# Patient Record
Sex: FEMALE | Race: WHITE | NOT HISPANIC OR LATINO | Employment: PART TIME | URBAN - METROPOLITAN AREA
[De-identification: names, ages, dates, MRNs, and addresses within clinical notes are randomized per-mention and may not be internally consistent; named-entity substitution may affect disease eponyms.]

---

## 2017-01-26 ENCOUNTER — TELEPHONE (OUTPATIENT)
Dept: SLEEP MEDICINE | Facility: CLINIC | Age: 37
End: 2017-01-26

## 2017-01-26 NOTE — TELEPHONE ENCOUNTER
MAGDALENE for patient to call back and schedule appointment with any provider in office regarding questions she wrote in a few months ago to dr. Simon

## 2017-02-08 ENCOUNTER — COMMUNICATION - HEALTHEAST (OUTPATIENT)
Dept: FAMILY MEDICINE | Facility: CLINIC | Age: 37
End: 2017-02-08

## 2017-02-10 ENCOUNTER — COMMUNICATION - HEALTHEAST (OUTPATIENT)
Dept: TELEHEALTH | Facility: CLINIC | Age: 37
End: 2017-02-10

## 2017-02-10 ENCOUNTER — OFFICE VISIT - HEALTHEAST (OUTPATIENT)
Dept: FAMILY MEDICINE | Facility: CLINIC | Age: 37
End: 2017-02-10

## 2017-02-10 DIAGNOSIS — R53.83 FATIGUE: ICD-10-CM

## 2017-02-10 DIAGNOSIS — R30.0 DYSURIA: ICD-10-CM

## 2017-02-10 DIAGNOSIS — L71.9 ROSACEA: ICD-10-CM

## 2017-02-10 DIAGNOSIS — L70.9 ACNE, UNSPECIFIED ACNE TYPE: ICD-10-CM

## 2017-02-10 ASSESSMENT — MIFFLIN-ST. JEOR: SCORE: 1309.09

## 2017-02-13 LAB — HBA1C MFR BLD: 5.2 % (ref 3.5–6.1)

## 2017-02-14 LAB — ANA SER QL: 0.1 U

## 2017-02-20 ENCOUNTER — OFFICE VISIT - HEALTHEAST (OUTPATIENT)
Dept: FAMILY MEDICINE | Facility: CLINIC | Age: 37
End: 2017-02-20

## 2017-02-20 DIAGNOSIS — R30.0 DYSURIA: ICD-10-CM

## 2017-02-20 DIAGNOSIS — N89.8 VAGINAL ITCHING: ICD-10-CM

## 2017-12-31 ENCOUNTER — HEALTH MAINTENANCE LETTER (OUTPATIENT)
Age: 37
End: 2017-12-31

## 2019-08-14 ENCOUNTER — OFFICE VISIT - HEALTHEAST (OUTPATIENT)
Dept: FAMILY MEDICINE | Facility: CLINIC | Age: 39
End: 2019-08-14

## 2019-08-14 DIAGNOSIS — M79.7 FIBROMYALGIA: ICD-10-CM

## 2019-08-14 DIAGNOSIS — M54.50 ACUTE BILATERAL LOW BACK PAIN WITHOUT SCIATICA: ICD-10-CM

## 2019-08-14 DIAGNOSIS — M54.9 MID BACK PAIN: ICD-10-CM

## 2019-10-09 ENCOUNTER — OFFICE VISIT - HEALTHEAST (OUTPATIENT)
Dept: FAMILY MEDICINE | Facility: CLINIC | Age: 39
End: 2019-10-09

## 2019-10-09 DIAGNOSIS — G93.32 CHRONIC FATIGUE SYNDROME: ICD-10-CM

## 2019-10-09 DIAGNOSIS — R79.0 LOW FERRITIN: ICD-10-CM

## 2019-10-09 DIAGNOSIS — R79.89 LOW VITAMIN D LEVEL: ICD-10-CM

## 2019-10-09 DIAGNOSIS — Z76.89 ENCOUNTER TO ESTABLISH CARE: ICD-10-CM

## 2019-10-09 DIAGNOSIS — M79.7 FIBROMYALGIA: ICD-10-CM

## 2019-10-09 DIAGNOSIS — Z00.00 ROUTINE GENERAL MEDICAL EXAMINATION AT A HEALTH CARE FACILITY: ICD-10-CM

## 2019-10-09 DIAGNOSIS — R53.83 OTHER FATIGUE: ICD-10-CM

## 2019-10-09 LAB
ANION GAP SERPL CALCULATED.3IONS-SCNC: 8 MMOL/L (ref 5–18)
BUN SERPL-MCNC: 11 MG/DL (ref 8–22)
CALCIUM SERPL-MCNC: 10 MG/DL (ref 8.5–10.5)
CHLORIDE BLD-SCNC: 108 MMOL/L (ref 98–107)
CHOLEST SERPL-MCNC: 154 MG/DL
CO2 SERPL-SCNC: 25 MMOL/L (ref 22–31)
CREAT SERPL-MCNC: 0.88 MG/DL (ref 0.6–1.1)
ERYTHROCYTE [DISTWIDTH] IN BLOOD BY AUTOMATED COUNT: 12.9 % (ref 11–14.5)
FASTING STATUS PATIENT QL REPORTED: YES
FERRITIN SERPL-MCNC: 8 NG/ML (ref 10–130)
GFR SERPL CREATININE-BSD FRML MDRD: >60 ML/MIN/1.73M2
GLUCOSE BLD-MCNC: 102 MG/DL (ref 70–125)
HBA1C MFR BLD: 5.3 % (ref 3.5–6)
HCT VFR BLD AUTO: 40.3 % (ref 35–47)
HDLC SERPL-MCNC: 70 MG/DL
HGB BLD-MCNC: 13.3 G/DL (ref 12–16)
LDLC SERPL CALC-MCNC: 76 MG/DL
MCH RBC QN AUTO: 28.3 PG (ref 27–34)
MCHC RBC AUTO-ENTMCNC: 33 G/DL (ref 32–36)
MCV RBC AUTO: 86 FL (ref 80–100)
PLATELET # BLD AUTO: 351 THOU/UL (ref 140–440)
PMV BLD AUTO: 7.6 FL (ref 7–10)
POTASSIUM BLD-SCNC: 4.4 MMOL/L (ref 3.5–5)
RBC # BLD AUTO: 4.69 MILL/UL (ref 3.8–5.4)
SODIUM SERPL-SCNC: 141 MMOL/L (ref 136–145)
T3 SERPL-MCNC: 102 NG/DL (ref 45–175)
T4 FREE SERPL-MCNC: 1 NG/DL (ref 0.7–1.8)
TRIGL SERPL-MCNC: 41 MG/DL
VIT B12 SERPL-MCNC: 413 PG/ML (ref 213–816)
WBC: 5.9 THOU/UL (ref 4–11)

## 2019-10-09 ASSESSMENT — ANXIETY QUESTIONNAIRES
GAD7 TOTAL SCORE: 18
IF YOU CHECKED OFF ANY PROBLEMS ON THIS QUESTIONNAIRE, HOW DIFFICULT HAVE THESE PROBLEMS MADE IT FOR YOU TO DO YOUR WORK, TAKE CARE OF THINGS AT HOME, OR GET ALONG WITH OTHER PEOPLE: VERY DIFFICULT
5. BEING SO RESTLESS THAT IT IS HARD TO SIT STILL: MORE THAN HALF THE DAYS
3. WORRYING TOO MUCH ABOUT DIFFERENT THINGS: NEARLY EVERY DAY
2. NOT BEING ABLE TO STOP OR CONTROL WORRYING: NEARLY EVERY DAY
1. FEELING NERVOUS, ANXIOUS, OR ON EDGE: NEARLY EVERY DAY
6. BECOMING EASILY ANNOYED OR IRRITABLE: MORE THAN HALF THE DAYS
7. FEELING AFRAID AS IF SOMETHING AWFUL MIGHT HAPPEN: MORE THAN HALF THE DAYS
4. TROUBLE RELAXING: NEARLY EVERY DAY

## 2019-10-09 ASSESSMENT — MIFFLIN-ST. JEOR: SCORE: 1299.57

## 2019-10-09 ASSESSMENT — PATIENT HEALTH QUESTIONNAIRE - PHQ9: SUM OF ALL RESPONSES TO PHQ QUESTIONS 1-9: 6

## 2019-10-10 ENCOUNTER — AMBULATORY - HEALTHEAST (OUTPATIENT)
Dept: FAMILY MEDICINE | Facility: CLINIC | Age: 39
End: 2019-10-10

## 2019-10-10 DIAGNOSIS — R79.0 LOW FERRITIN: ICD-10-CM

## 2019-10-10 DIAGNOSIS — R79.89 LOW VITAMIN D LEVEL: ICD-10-CM

## 2019-10-10 LAB
25(OH)D3 SERPL-MCNC: 17.3 NG/ML (ref 30–80)
25(OH)D3 SERPL-MCNC: 17.3 NG/ML (ref 30–80)

## 2019-10-14 ENCOUNTER — RECORDS - HEALTHEAST (OUTPATIENT)
Dept: ADMINISTRATIVE | Facility: OTHER | Age: 39
End: 2019-10-14

## 2019-10-17 ENCOUNTER — COMMUNICATION - HEALTHEAST (OUTPATIENT)
Dept: FAMILY MEDICINE | Facility: CLINIC | Age: 39
End: 2019-10-17

## 2019-11-04 ENCOUNTER — OFFICE VISIT - HEALTHEAST (OUTPATIENT)
Dept: FAMILY MEDICINE | Facility: CLINIC | Age: 39
End: 2019-11-04

## 2019-11-04 DIAGNOSIS — G93.32 CHRONIC FATIGUE SYNDROME: ICD-10-CM

## 2019-11-04 DIAGNOSIS — R79.0 LOW FERRITIN: ICD-10-CM

## 2019-11-04 DIAGNOSIS — M79.7 FIBROMYALGIA: ICD-10-CM

## 2019-11-04 DIAGNOSIS — R79.89 LOW VITAMIN D LEVEL: ICD-10-CM

## 2019-11-05 ENCOUNTER — HEALTH MAINTENANCE LETTER (OUTPATIENT)
Age: 39
End: 2019-11-05

## 2020-03-03 ENCOUNTER — COMMUNICATION - HEALTHEAST (OUTPATIENT)
Dept: FAMILY MEDICINE | Facility: CLINIC | Age: 40
End: 2020-03-03

## 2020-11-22 ENCOUNTER — HEALTH MAINTENANCE LETTER (OUTPATIENT)
Age: 40
End: 2020-11-22

## 2021-05-26 ASSESSMENT — PATIENT HEALTH QUESTIONNAIRE - PHQ9: SUM OF ALL RESPONSES TO PHQ QUESTIONS 1-9: 6

## 2021-05-28 ASSESSMENT — ANXIETY QUESTIONNAIRES: GAD7 TOTAL SCORE: 18

## 2021-05-30 VITALS — HEIGHT: 66 IN | WEIGHT: 137.2 LBS | BODY MASS INDEX: 22.05 KG/M2

## 2021-05-30 VITALS — BODY MASS INDEX: 22.11 KG/M2 | WEIGHT: 137 LBS
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
## 2021-06-02 NOTE — PATIENT INSTRUCTIONS - HE
"MINDFULNESS    \"Mindfulness is about being fully awake in our lives. It is about perceiving the exquisite vividness of each moment.\"      - Fito Medina  Mindfulness-Based Stress Reduction (MBSR) is a blend of meditation, body awareness, and yoga:   learning through practice and study how your body handles (and can resolve) stress neurologically.     Mindfulness Resources (all are free):  1. \"Calm\" mary- free trial has guided 10min sessions on anxiety, gratitude, sleep, happiness, managing stress, etc.   2. \"Smiling Minds\" mary- short guided sessions for children and adults  3. \"Curable Pain Relief\" mary- for chronic pain  4. \"Insight Timer\" mary- free meditation timer with musical or bell tones, can also stream guided meditations    5. Free 8 week MBSR program online: [email or web address]        Consider a HAPPY LIGHT  Consider Turmeric supplement for pain  "

## 2021-06-03 VITALS
WEIGHT: 140.7 LBS | SYSTOLIC BLOOD PRESSURE: 112 MMHG | HEART RATE: 76 BPM | DIASTOLIC BLOOD PRESSURE: 64 MMHG | BODY MASS INDEX: 23.41 KG/M2

## 2021-06-03 VITALS
WEIGHT: 138.6 LBS | BODY MASS INDEX: 23.09 KG/M2 | HEART RATE: 80 BPM | HEIGHT: 65 IN | SYSTOLIC BLOOD PRESSURE: 108 MMHG | DIASTOLIC BLOOD PRESSURE: 62 MMHG

## 2021-06-03 VITALS — WEIGHT: 138 LBS | BODY MASS INDEX: 22.27 KG/M2

## 2021-06-03 NOTE — PATIENT INSTRUCTIONS - HE
"You can increase your iron levels by eating more iron in your diet. Good sources of iron are actually iron-fortified cereals (like All Bran, or Cream of Wheat). Iron can be found in red meats such as beef, steak, liver; as well as almonds, lentil beans, chickpeas, green leafy vegetables like spinach, peaches, prune juice and raisins.       MINDFULNESS    \"Mindfulness is about being fully awake in our lives. It is about perceiving the exquisite vividness of each moment.\"      - Fito Medina  Mindfulness-Based Stress Reduction (MBSR) is a blend of meditation, body awareness, and yoga:   learning through practice and study how your body handles (and can resolve) stress neurologically.     Mindfulness Resources (all are free):  1. \"Calm\" mary- free trial has guided 10min sessions on anxiety, gratitude, sleep, happiness, managing stress, etc.   2. \"Smiling Minds\" mary- short guided sessions for children and adults  3. \"Curable Pain Relief\" mary- for chronic pain  4. \"Insight Timer\" mary- free meditation timer with musical or bell tones, can also stream guided meditations    5. Free 8 week MBSR program online: [email or web address]        "

## 2021-06-08 NOTE — PROGRESS NOTES
ASSESSMENT & PLAN:  1. Fatigue  - HM2(CBC w/o Differential)  - Comprehensive Metabolic Panel  - Thyroid Stimulating Hormone (TSH)  - Vitamin D, Total (25-Hydroxy)  - Ferritin  - Iron and Transferrin Iron Binding Capacity  - Antinuclear Antibody (JOSIAS) Cascade  - Glycosylated Hemoglobin A1c    2. Dysuria  - Culture, Urine  - Urinalysis    3. Rosacea    4. Acne, unspecified acne type     Multifactorial for fatigue.  We will do labs.  If normal, closely monitor for changes.  We'll also do a UA, UC.  Advised fluid hydration.  Could be dehydration to cause her urine to be cloudy or concentrated.  Monitor for frequency of confirmed diagnosis of UTI.  If recurrent, we also discussed about ruling out nephrolithiasis.  She will check her records and when her last physical was and consider annual physical.  She was agreeable with the plans.    Orders Placed This Encounter   Procedures     Culture, Urine     Urinalysis     HM2(CBC w/o Differential)     Comprehensive Metabolic Panel     Thyroid Stimulating Hormone (TSH)     Vitamin D, Total (25-Hydroxy)     Ferritin     Iron and Transferrin Iron Binding Capacity     Antinuclear Antibody (JOSIAS) Cascade     Glycosylated Hemoglobin A1c        CHIEF COMPLAINT:  Chief Complaint   Patient presents with     Follow-up     ER follow up for UTI- improved, but still cloudy on and off.      Establish Care     Labs Only     Past h/o low iron.         HISTORY OF PRESENT ILLNESS:  Casie is a 36 y.o. female presenting to the clinic today to establish care and for evaluation of her cloudy urine. She was previously a patient of Dr. Baumann at [facility]. [facility]     Hematuria/UTI: She was having recurrent UTI's in 2016; treatment with cephalexin had been working in the past. She was not improving while taking cephalexin on 12/31/2016 so she went to the ED; she was not having any fever or chills at that time. Her urine culture from that ED visit was  negative for growth after an abnormal UA. She took a course of cephalexin for 10 days, but her urine is still cloudy intermittently and she would like to know why. She denies any current fever or chills; her urine is occasionally cloudy and there is dysuria when her urine is very concentrated. She is wondering if she can have dysuria due to dehydration. She also has fibromyalgia and she is not sure which pain is from where at this point. Two days ago, she had dysuria again and so she started drinking cranberry juice. She has been having urinary frequency for the past 2-3 days, and her urine has been very concentrated. She thinks her urine might have been concentrated due to dehydrations, so she began drinking more water. She frequently rides her horse, and so she notes that it could cause muscle tension causing pressure on her bladder. She is a  and so she notes there were actual cloudiness swirls in her urine rather than just a darker or lighter color; she is wondering if cloudiness can be a normal variant. She denies any constant abdominal pain or lower back pain, and she has no history of kidney problems. She typically drinks 1 liter or more of water per day. Today she is not fully hydrated because she dehydrated herself for this office visit in case anyone wanted to see her concentrated urine; there is no distinct odor in her urine today. She thinks has to drink a lot more water than the average person and it has been typical that she becomes dehydrated easily. She has abdominal pain due to fibromyalgia, but there has been no atypical abdominal pain. She has had 3-4 diagnosed UTI's in 2016 which is more than she has ever had. She states that her urine cultures have never been positive for bacterial growth. She is  and has had no new sexual partners nor participated in any water activities.     Rosacea with Acne: She was treated for rosacea and acne with an antibiotic recently which also helped  ameliorate her UTI symptoms; she completed a course of cephalexin prescribed by a provider at [facility]. She noticed her skin had rosacea after she took Vitron C so she stopped taking that.     Hx Anemia: She has a history of low iron but she has never required a blood transfusion or iron infusion. She has been taking a supplement regularly. She currently feels tired and would like her iron levels checked; she has been eating more red meat and broccoli and spinach lately but she had been avoiding red meat previously because she was trying to eat healthy. She denies any abnormal bleeding, heavy menstrual bleeding, or menstruation changing over time. She notes her ferratin was 7 in June 2016.     Fibromyalgia: Her fatigue is getting worse every year.    Vitamin D Deficiency: She takes 4000 IU vitamin D daily which she takes consistently. She pushes through, but finds herself very fatigued and concerned with her health in general.     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 She still has some pain at the inguinal sites of insertion which were used for her cardiac catheter ablation.     Allergies: She takes Allegra and benadryl regularly for allergies.    REVIEW OF SYSTEMS:   She denies any history of an enlarged heart, diabetes, asthma, blood disorders, or blood clots. All other systems are negative.     PFSH:  Her father is not formally diagnosed but possible has fibromyalgia. He has been diagnosed with atrial fibrillation and he has had joints replaced. Her mother has been fully diagnosed with fibromyalgia and arthritis; she has also had a Stage 0 breast cancer removed completely by lumpectomy. Her brother also has atrial fibrillation. Her  "maternal grandmother had hypothyroidism, her maternal uncle had rosacea, and she denies any known family history of diabetes, heart attacks, or nephrolithiases.     History   Smoking Status     Never Smoker   Smokeless Tobacco     Never Used        Family History   Problem Relation Age of Onset     Fibromyalgia Mother      Breast cancer Mother      \"pre-breast cancer\"     Fibromyalgia Father      Atrial fibrillation Father      Atrial fibrillation Brother      Hypothyroidism Maternal Grandmother      Rosacea Maternal Uncle         Social History     Social History     Marital status:      Spouse name: N/A     Number of children: N/A     Years of education: N/A     Occupational History     Not on file.     Social History Main Topics     Smoking status: Never Smoker     Smokeless tobacco: Never Used     Alcohol use Not on file     Drug use: Not on file     Sexual activity: Not on file     Other Topics Concern     Not on file     Social History Narrative     No narrative on file        Past Surgical History:   Procedure Laterality Date     CARDIAC ELECTROPHYSIOLOGY STUDY AND ABLATION          Allergies   Allergen Reactions     Amoxicillin      Dairy      Erythromycin      Gluten Protein      Sulfa (Sulfonamide Antibiotics)      Tetracycline      Azithromycin (Bulk) Rash     Not quite sure of the reaction     Citalopram Anxiety        Active Ambulatory Problems     Diagnosis Date Noted     Anxiety 05/25/2010     Fibromyalgia 05/25/2010     Rosacea 02/10/2017     Resolved Ambulatory Problems     Diagnosis Date Noted     Supraventricular tachycardia 05/25/2010     Acne 02/10/2017     Past Medical History:   Diagnosis Date     Acne 2/10/2017     Iron deficiency anemia      Recurrent UTI 2016     Supraventricular tachycardia 5/25/2010     Vitamin D deficiency         VITALS:  Vitals:    02/10/17 1415   BP: 130/70   Patient Site: Left Arm   Patient Position: Sitting   Cuff Size: Adult Regular   Pulse: 87   Temp: 97.5 " " F (36.4  C)   TempSrc: Oral   SpO2: 97%   Weight: 137 lb 3.2 oz (62.2 kg)   Height: 5' 6\" (1.676 m)     Wt Readings from Last 3 Encounters:   02/10/17 137 lb 3.2 oz (62.2 kg)   12/31/16 134 lb (60.8 kg)     Body mass index is 22.14 kg/(m^2).     PHYSICAL EXAM:  Visit Vitals     /70 (Patient Site: Left Arm, Patient Position: Sitting, Cuff Size: Adult Regular)     Pulse 87     Temp 97.5  F (36.4  C) (Oral)     Ht 5' 6\" (1.676 m)     Wt 137 lb 3.2 oz (62.2 kg)     LMP 02/09/2017 (Exact Date)     SpO2 97%     Breastfeeding No     BMI 22.14 kg/m2       Vital signs noted above. AAO ×3.  HEENT no nasal discharge, moist oral mucosa. Wearing glasses. Neck: Supple neck, nonpalpable cervical lymph nodes. No thyromegaly. Lungs: Clear to auscultation bilateral.  Heart: S1-S2 regular rate and rhythm, no murmurs were noted.  Abdomen: Flat, soft with bowel sounds and nontender.  Extremities: pulses were full and equal.  Skin: Erythematous patch in her cheeks, nose.     DATA REVIEWED:  ADDITIONAL HISTORY SUMMARIZED (2): Reviewed ED summary 12/31/2016 regarding UTI.   DECISION TO OBTAIN EXTRA INFORMATION (1): [facility] for [facility] and [place].   RADIOLOGY TESTS (1): None.  LABS (1): Reviewed and ordered labs.  MEDICINE TESTS (1): None.  INDEPENDENT REVIEW (2 each): None.      The visit lasted a total of 20 minutes face to face with the patient. Over 50% of the time was spent counseling and educating the patient about her health history, chronic health conditions, and coordination of care.     ITeresa, am scribing for and in the presence of Dr. Shelton.  IDr. Shelton, personally performed the services described in this documentation, as scribed by Teresa Winston in my presence, and it is both accurate and complete.     MEDICATIONS:  Current Outpatient Prescriptions   Medication Sig Dispense Refill     CEPHALEXIN ORAL Take by mouth.       cholecalciferol, vitamin D3, (VITAMIN D3) 4,000 unit cap Take by mouth daily.       CRANBERRY " FRUIT EXTRACT (CRANBERRY ORAL) Take by mouth.       diphenhydrAMINE (BENADRYL) 25 mg capsule Take 50 mg by mouth 2 (two) times a day.       FEXOFENADINE HCL (ALLEGRA ORAL) Take by mouth every morning.       IBUPROFEN (ADVIL ORAL) Take by mouth as needed.       MAGNESIUM ORAL Take by mouth. 350 mg daily.       metroNIDAZOLE (METROGEL) 0.75 % gel Apply topically 2 (two) times a day.       No current facility-administered medications for this visit.         Total data points: 4

## 2021-06-09 NOTE — PROGRESS NOTES
Assessment/Plan:        Diagnoses and all orders for this visit:    Vaginal itching  -     Wet Prep, Vaginal    Dysuria  -     Culture, Urine  -     Urinalysis        Wet prep and urinalysis done.  Await results prior to further treatment.  If initial UA shows increased amount of leukocytes, nitrites then we will start her on cephalexin 500 mg 4 times a day for 7 days.  To avoid intravaginal products, strong scented skin products or feminine wash.  Avoid baths and instead do showers.  Fluid hydration, precautionary measures.  Limit sweets.  Also discussed measures to improve her anxiety.  Distraction, positive mindset.  She was also advised that if there is doubt with symptom manifestation, to have it evaluated.  She was agreeable with the plans.  Subjective:    Patient ID: Casie Pappas is a 36 y.o. female.    HPI    Casie is here with concerns about vaginal infection versus urinary tract infection.  She's had several yeast infection last year.  She wonders if it was related to dietary changes.  She's cut down on gluten, dairy.  She likes her sweets.  Started noticing blood in her urine last night.  Describes it as a clump of mucus with some blood in it.  When she wiped herself, had some pink tinge discoloration.  She also notes nocturia, dysuria, pelvic discomfort and irritation in the vaginal region.  She has been noticing vaginal itching for the past couple of days.  Intermittent cloudy urine.  She feels that the urethra is also irritated.  Denies any new intravaginal products, recent water activities.  She takes baths instead of showers.  No recent water activities.  No fever, chills, nausea, vomiting.  She is prone to E. coli infection.  She had folliculitis which grew E. coli.  She had a severe urinary infection in December and had blood noted.  She was treated with Keflex for 10 days.  Causes more anxiety with these flareups.  Wonders about options that she can try to improve her anxiety.  She is anxious, her  neck and pelvic region feels tight and uncomfortable.  Wonders if symptoms are related to fibromyalgia, difficulty differentiating with acute infection.    Review of Systems  As above otherwise negative.        Objective:    Physical Exam  Visit Vitals     /70 (Patient Site: Left Arm, Patient Position: Sitting, Cuff Size: Adult Regular)     Pulse 92     Wt 137 lb (62.1 kg)     LMP 02/09/2017 (Exact Date)     SpO2 99%     Breastfeeding No     BMI 22.11 kg/m2       Vital signs noted above. AAO ×3.  HEENT no nasal discharge, moist oral mucosa.  Neck: Supple neck, nonpalpable cervical lymph nodes.  Lungs: Clear to auscultation bilateral.  Heart: S1-S2 regular rate and rhythm, no murmurs were noted.  Abdomen: Flat, soft with bowel sounds and nontender.  Extremities: pulses were full and equal.  Pelvic: Cloudy whitish to cream colored discharge.  No fresh blood noted.

## 2021-06-15 PROBLEM — L71.9 ROSACEA: Status: ACTIVE | Noted: 2017-02-10

## 2021-06-16 PROBLEM — R79.0 LOW FERRITIN: Status: ACTIVE | Noted: 2019-11-05

## 2021-06-16 PROBLEM — R79.89 LOW VITAMIN D LEVEL: Status: ACTIVE | Noted: 2019-11-05

## 2021-06-19 NOTE — LETTER
Letter by Rajwinder Pompa MD at      Author: Rajwinder Pompa MD Service: -- Author Type: --    Filed:  Encounter Date: 10/17/2019 Status: Signed         October 18, 2019     Patient: Casie Pappas   YOB: 1980   Date of Visit: 10/17/2019       To Whom It May Concern:    It is my medical opinion that Casie Pappas would benefit from short term modifcations to her work environment.  She has a few health issues that are treatable and she is engaged in this therapy. This letter is an effort to communicate her situation to you, and to also provide reassurance that she intends to fulfill her work commitments with modifications and your support.      Specifically, Casie will work to keep a structured schedule to fulfill her work duties but may need to arrive or leave at different times, or work remotely. Particularly, driving when fatigued has been challenging for Casie. However, she feels she is able to do her work to a very high quality standard. However, these health issues have been causing her a lot of stress, and having your support will be very valuable. Please continue to provide clear expectations of work requirements and deadlines. Casie speaks very highly of her work and feels this is a short term situation.    If you have any questions or concerns, please don't hesitate to call.    Sincerely,        Electronically signed by Rajwinder Pompa MD

## 2021-06-28 NOTE — PROGRESS NOTES
Progress Notes by Rajwinder Pompa MD at 10/9/2019 10:40 AM     Author: Rajwinder Pompa MD Service: -- Author Type: Physician    Filed: 10/12/2019  3:40 PM Encounter Date: 10/9/2019 Status: Signed    : Rajwinder Pompa MD (Physician)       FEMALE PREVENTATIVE EXAM    Assessment and Plan:     1. Encounter to establish care  2. Routine general medical examination at a health care facility  She defers Pap, tetanus until the next visit at follow-up in 3 to 4 weeks  - Ferritin  - Glycosylated Hemoglobin A1c  - Vitamin B12  - Vitamin D, Total (25-Hydroxy)  - Lipid Cascade  - HM2(CBC w/o Differential)  - T4, Free  - T3, Total    3. Fibromyalgia  Chronic fatigue syndrome  Long conversation and the majority of time spent today was on her fibromyalgia diagnosis back in 2007.  She has been living with this and frequently struggles with mistrust of herself with unpredictability of her symptoms as well as mistrust of the healthcare system.  She has received variable diagnoses with regard to her sleep medicine results.  She is potentially interested in working remotely from the [facility] and we will revisit this next month.  She is interested in nonmedication therapy to start with regarding anxiety and low mood symptoms that she experiences as a result of her fibromyalgia and chronic fatigue.  I recommend we start by checking electrolytes, thyroid studies, recheck ferritin and vitamin D given these have been low in the past.   Discussed options for turmeric for pain.     We reviewed mindfulness, encouraged to continue her exercise routine.  Going forward we could consider hydroxyzine or Cymbalta.    - Ambulatory referral to Psychology  - naproxen (NAPROSYN) 500 MG tablet; Take 1 tablet (500 mg total) by mouth 2 (two) times a day with meals.  Dispense: 60 tablet; Refill: 2  - Vitamin B12  - HM2(CBC w/o Differential)  - T4, Free  - T3, Total  - Basic Metabolic Panel    4. Low ferritin  - Ferritin    5. Low  "vitamin D level  - Vitamin D, Total (25-Hydroxy)      I spent 25 minutes with the patient specifically addressing medical issues and problems as above, >50% of which was in counseling.  This was in addition to routine preventative health discussions.       Next follow up:  Return in about 4 weeks (around 11/6/2019) for Recheck.    Immunization Review  Adult Imm Review: No immunizations due today    I discussed the following with the patient:   Adult Healthy Living: Importance of regular exercise  Healthy nutrition  Getting adequate sleep  Stress management  Use of seat belts  Distracted driving  STI prevention  Contraception options  Supplement use  Herbal medications/alternative medical therapies      Subjective:   Chief Complaint: Casie Pappas is an 39 y.o. female here for a preventative health visit.     HPI:    Chief Complaint   Patient presents with   ? Establish Care     hx of low vitamin D, Iron. Fibromyalgia   ? Annual Exam     pap due- normal but has cycle today that is heavier, not fasting due to low blood sugar     New to me, we reviewed her extensive hx- especially with regard to her fibromyalgia and chronic fatigue syndrome.    She is discouraged about how she feels- ongoing x 10 years. Has been trying to manage her fibromyalgia and chronic fatigue syndrome non-pharmacologically and tries to be physically active if she can.   Stressed at work, always tired. Throws up if eating gluten/dairy.   Unpredictable aspects of her life are frustrating. Like never knowing how far to push through pain.   Lots of anxiety. Doesn't feel good.   Tired, sore body from her fibromyalgia.   Doing a lot of things- exercising, riding horses, taking Magnesium.  Wondering if she needs to take time off of her work or work from home to cope a little better.     Two sleep medicine doctors have disagreed with eachother- one said periodic limb movement disorder vs lw iron. She is thus not quite trustful of \"the system\".     2/2017 " "labs reviewed: JOSIAS negative. TSH 0.75. Vit D 31.3 (was low at 19 in 2014)  Ferritin was low at 11.   Iron supplement made her face red.   Rosacea - was seeing dermatology. Thinks it is sensitive skin. Not a concern for her right now.     Healthy Habits  Are you taking a daily aspirin? No  Do you typically exercising at least 40 min, 3-4 times per week?  Yes  Do you usually eat at least 4 servings of fruit and vegetables a day, include whole grains and fiber and avoid regularly eating high fat foods? Yes  Have you had an eye exam in the past two years? NO  Do you see a dentist twice per year? Yes  Do you have any concerns regarding sleep? YES    Safety Screen  If you own firearms, are they secured in a locked gun cabinet or with trigger locks? Yes  Do you feel you are safe where you are living?: Yes (10/9/2019 10:46 AM)  Do you feel you are safe in your relationship(s)?: Yes (10/9/2019 10:46 AM)      Review of Systems:  Please see above.  The rest of the review of systems are negative for all systems.     Pap History:   due for pap today, defers. No h/o abnormals  Cancer Screening       Status Date      PAP SMEAR Next Due 9/11/2020      Done 9/11/2015 GYNECOLOGIC CYTOLOGY (PAP SMEAR)          Patient Care Team:  Rajwinder Pompa MD as PCP - General (Family Medicine)  Rajesh Delgadillo MD as Assigned PCP        History     Reviewed By Date/Time Sections Reviewed    Rajwinder Pompa MD 10/12/2019  3:35 PM Medical, Surgical, Tobacco, Family    Rajwinder Pompa MD 10/12/2019  3:29 PM Social Documentation    Oriana Benson LPN 10/9/2019 10:45 AM Tobacco            Objective:   Vital Signs:   Visit Vitals  /62 (Patient Site: Left Arm, Patient Position: Sitting, Cuff Size: Adult Regular)   Pulse 80   Ht 5' 5\" (1.651 m)   Wt 138 lb 9.6 oz (62.9 kg)   LMP 10/06/2019 (Exact Date)   BMI 23.06 kg/m           PHYSICAL EXAM  Constitutional: Patient is oriented to person, place, and time. Patient " appears well-developed and well-nourished. No distress.   Head: Normocephalic and atraumatic.   Ears: External ear and TMs normal bilaterally.  Nose: Nose normal.   Mouth/Throat: Oropharynx is clear and moist. No oropharyngeal exudate.   Eyes: Conjunctivae and EOM are normal. Pupils are equal, round, and reactive to light. No discharge. No scleral icterus.   Neck: Neck supple. No JVD present. No tracheal deviation present. No thyromegaly present.  Breasts: not indicated based on USPSTF recommendations for asymptomatic women  Cardiovascular: Normal rate, regular rhythm, normal heart sounds and intact distal pulses. No murmur heard.   Pulmonary/Chest: Effort normal and breath sounds normal. Patient has no wheezes, no rales, exhibits no tenderness.   Abdominal: Soft. Bowel sounds are normal. No masses. There is no tenderness.   Lymphadenopathy:  Patient has no cervical adenopathy.   Neurological: Patient is alert and oriented to person, place, and time. Patient has normal reflexes. No cranial nerve deficit. Coordination normal.   Skin: Skin is warm and dry. No rash noted. No pallor.   Pelvic: pt declined exam- due for pap today but wants to come back for this given onset of her menses  Psychiatric: Patient has good eye contact without any psychomotor retardation or stereotypic behaviors.  normal mood and affect. Judgment and thought content normal.   Speech is regular rate and rhythm.              Medication List          Accurate as of October 9, 2019 11:59 PM. If you have any questions, ask your nurse or doctor.            START taking these medications    naproxen 500 MG tablet  Also known as:  NAPROSYN  INSTRUCTIONS:  Take 1 tablet (500 mg total) by mouth 2 (two) times a day with meals.  Started by:  Rajwinder Pompa MD           CONTINUE taking these medications    ADVIL ORAL  INSTRUCTIONS:  Take by mouth as needed.        ALLEGRA ORAL  INSTRUCTIONS:  Take by mouth every morning.        CRANBERRY  ORAL  INSTRUCTIONS:  Take by mouth.        diphenhydrAMINE 25 mg capsule  Also known as:  BENADRYL  INSTRUCTIONS:  Take 50 mg by mouth 2 (two) times a day.        MAGNESIUM ORAL  INSTRUCTIONS:  Take by mouth. 350 mg daily.        metroNIDAZOLE 0.75 % gel  Also known as:  METROGEL  INSTRUCTIONS:  Apply topically 2 (two) times a day.        VITAMIN D3 4,000 unit Cap  INSTRUCTIONS:  Take by mouth daily.  Generic drug:  cholecalciferol (vitamin D3)              Where to Get Your Medications      These medications were sent to [id number] - [place], [place] - [facility] [address] AT [id number] [facility] [address]    Phone:  [phone]     naproxen 500 MG tablet         Additional Screenings Completed Today:

## 2021-07-03 NOTE — ADDENDUM NOTE
Addendum Note by Tiffany Mcmahon MLT at 2/13/2017  7:54 AM     Author: Tiffany Mcmahon MLT Service: -- Author Type:     Filed: 2/13/2017  7:54 AM Encounter Date: 2/10/2017 Status: Signed    : Tiffany Mcmahon MLT ()    Addended by: TIFFANY MCMAHON on: 2/13/2017 07:54 AM        Modules accepted: Orders

## 2021-07-05 ENCOUNTER — COMMUNICATION - HEALTHEAST (OUTPATIENT)
Dept: FAMILY MEDICINE | Facility: CLINIC | Age: 41
End: 2021-07-05

## 2021-07-05 DIAGNOSIS — F41.8 SITUATIONAL ANXIETY: ICD-10-CM

## 2021-08-07 ENCOUNTER — MYC MEDICAL ADVICE (OUTPATIENT)
Dept: FAMILY MEDICINE | Facility: CLINIC | Age: 41
End: 2021-08-07

## 2021-08-13 ENCOUNTER — VIRTUAL VISIT (OUTPATIENT)
Dept: FAMILY MEDICINE | Facility: CLINIC | Age: 41
End: 2021-08-13
Payer: COMMERCIAL

## 2021-08-13 DIAGNOSIS — F41.8 SITUATIONAL ANXIETY: Primary | ICD-10-CM

## 2021-08-13 PROCEDURE — 99213 OFFICE O/P EST LOW 20 MIN: CPT | Mod: 95 | Performed by: FAMILY MEDICINE

## 2021-08-13 RX ORDER — BACITRACIN ZINC AND POLYMYXIN B SULFATE 500; 10000 [USP'U]/G; [USP'U]/G
OINTMENT TOPICAL
COMMUNITY

## 2021-08-13 RX ORDER — HYDROXYZINE HYDROCHLORIDE 10 MG/1
10 TABLET, FILM COATED ORAL 3 TIMES DAILY PRN
COMMUNITY
Start: 2021-07-06 | End: 2023-02-01

## 2021-08-13 RX ORDER — LORAZEPAM 0.5 MG/1
0.5 TABLET ORAL EVERY 6 HOURS PRN
Qty: 6 TABLET | Refills: 0 | Status: SHIPPED | OUTPATIENT
Start: 2021-08-13 | End: 2023-02-01

## 2021-08-13 NOTE — PROGRESS NOTES
VIDEO VISIT  Due to current COVID19 pandemic, pt was offered virtual visit in lieu of in office evaluation to limit exposures.      Assessment/plan  Casie Pappas is a 41 year old female who is established to my practice.    Situational anxiety  Reviewed and is excellent questions regarding Covid vaccination specifically in regard to her fibromyalgia, low ferritin and chronic aches and pains that she is dealing with on a daily basis.  She is improving slowly with her iron supplementation.  Overall I have encouraged vaccinations still and she is agreeable after conversation.  - reviewed side effects of Vaccination in detail in comarison with side effects of COVID/long COVID and the delta varient knowledge to date; goal for vaccination is to reduce severe illness, hospitalization and death  - risks of remaining not vaccinated are higher than risks with vaccination even with her condition in my opinion   - encouraged having an emotional support person with her during the vaccine or even the following 12-36hr when side effects might develop   - advised to have a day with some light activities planned to take her mind off her body   - discussed taking tylenol/ibuprofen after dose   - can do a little ativan before hand- Rx sent for #6 tabs of 0.5mg so she can try it out before getting her vaccine   - can schedule a visit to review how she is feeling post vaccine if she has any concerns      COVID19 precautions reviewed, questions answered. Referred to CDC for latest updates.     Follow up: Return in about 2 months (around 10/13/2021) for Recheck, or sooner if symptoms not improving.    Rajwinder Pompa MD    Video-Visit Details- see CA note for consent  Video visit start time: 12:36pm  Video visit end time: 12:53pm  Total time: 17min  Originating Location (pt. Location): Home  Distant Location (provider location):  [facility]   Mode of Communication:  Video Conference via [employer]  "minutes spent on the date of the encounter doing chart review, patient visit and documentation.    Subjective:      HPI: Casie Pappas is a 41 year old female who is being evaluated via a billable video visit due to COVID19 pandemic precautions.    Chief Complaint   Patient presents with     Imm/Inj     Discuss getting COVID injection        Discuss COVID vaccination: In July she was feeling really run down and iron was lower; so she started it and feeling so much better but still struggling.     She feels the \"side effects of COVID vaccine every day\"  She does believe in the vaccin and thinks it would be good but currently \"has never felt worse\" with her fibromyalgia condition right now.        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    10 years, no children.   She is very physically active despite her fatigue/fibro sx: horseback riding 1-2hr 4x/week, walks dog daily.  No alcohol. Never smoker.   Rajwinder Pompa MD         Medical History:   I have reviewed and updated the patient's Past Medical History, Social History, Family History and Medication List.    Medications:  Current Outpatient Medications   Medication Sig Dispense Refill     Bacillus Coagulans-Inulin (PROBIOTIC FORMULA) 1-250 BILLION-MG CAPS 1 cap(s)       Cholecalciferol (VITAMIN D3 PO) Take 4,000 Units by mouth daily       DiphenhydrAMINE HCl (BENADRYL PO) Take 50 mg by mouth nightly as needed       Fexofenadine HCl (ALLEGRA ALLERGY PO)        hydrOXYzine (ATARAX) 10 MG tablet Take 10 mg by mouth 3 times daily as needed       IBUPROFEN PO        IRON HEME POLYPEPTIDE PO Take 40 mg by mouth daily       LORazepam (ATIVAN) 0.5 MG tablet Take 1 tablet (0.5 mg) by mouth every 6 hours as needed for anxiety 6 tablet 0     MAGNESIUM PO Take 350 mg by mouth       Multiple " Vitamin (MULTIVITAMIN ADULT PO)          Imaging & Labs reviewed this visit:    Results for NEMESIO RINALDI (MRN [id number]) as of 8/13/2021 13:32   Ref. Range 6/24/2016 11:41 10/9/2019 11:46 7/5/2021 12:25   Ferritin Latest Ref Range: 10 - 130 ng/mL 7 (L) 8 (L) 6 (L)       Objective:     Vitals - Patient Reported  Weight (Patient Reported): 65.8 kg (145 lb)    Physical Exam:     General: Appears well, no acute distress.   HEET: normocephalic conjunctivae are clear  Neck: supple   Lungs: Normal respiratory effort. No audible wheezing.   Heart: No visible JVD, no visible LE swelling  Abdomen: comfortable during routine conversation without guarding   Skin: clear without rash or lesions  Neuro: alert    Rajwinder Pompa MD

## 2021-08-18 ENCOUNTER — MYC MEDICAL ADVICE (OUTPATIENT)
Dept: FAMILY MEDICINE | Facility: CLINIC | Age: 41
End: 2021-08-18

## 2021-09-09 ENCOUNTER — OFFICE VISIT (OUTPATIENT)
Dept: FAMILY MEDICINE | Facility: CLINIC | Age: 41
End: 2021-09-09
Payer: COMMERCIAL

## 2021-09-09 VITALS
SYSTOLIC BLOOD PRESSURE: 112 MMHG | HEART RATE: 76 BPM | WEIGHT: 147.9 LBS | DIASTOLIC BLOOD PRESSURE: 78 MMHG | BODY MASS INDEX: 24.61 KG/M2

## 2021-09-09 DIAGNOSIS — G93.32 CHRONIC FATIGUE SYNDROME: ICD-10-CM

## 2021-09-09 DIAGNOSIS — F41.8 SITUATIONAL ANXIETY: ICD-10-CM

## 2021-09-09 DIAGNOSIS — R79.0 LOW FERRITIN: ICD-10-CM

## 2021-09-09 DIAGNOSIS — M79.7 FIBROMYALGIA: ICD-10-CM

## 2021-09-09 DIAGNOSIS — G93.32 CHRONIC FATIGUE SYNDROME: Primary | ICD-10-CM

## 2021-09-09 DIAGNOSIS — R79.89 LOW VITAMIN D LEVEL: ICD-10-CM

## 2021-09-09 LAB
FERRITIN SERPL-MCNC: 14 NG/ML (ref 10–130)
HGB BLD-MCNC: 14.3 G/DL (ref 11.7–15.7)
VIT B12 SERPL-MCNC: 445 PG/ML (ref 213–816)

## 2021-09-09 PROCEDURE — 82607 VITAMIN B-12: CPT

## 2021-09-09 PROCEDURE — 36415 COLL VENOUS BLD VENIPUNCTURE: CPT | Performed by: FAMILY MEDICINE

## 2021-09-09 PROCEDURE — 82728 ASSAY OF FERRITIN: CPT | Performed by: FAMILY MEDICINE

## 2021-09-09 PROCEDURE — 82306 VITAMIN D 25 HYDROXY: CPT | Performed by: FAMILY MEDICINE

## 2021-09-09 PROCEDURE — 85018 HEMOGLOBIN: CPT | Performed by: FAMILY MEDICINE

## 2021-09-09 PROCEDURE — 99214 OFFICE O/P EST MOD 30 MIN: CPT | Performed by: FAMILY MEDICINE

## 2021-09-09 NOTE — PROGRESS NOTES
Assessment/plan   Casie Pappas is a 41 year old female who is established to my practice.    Casie was seen today for labs.    Diagnoses and all orders for this visit:    Chronic fatigue syndrome  Low ferritin  Ferritin  Situational anxiety  Long conversation today regarding her current symptom improvement with recent iron supplementation with a Heme iron product.  Ferritin did improve notably from a level of 6-14 with this intervention as well as improvement in her hemoglobin from 12.5-4.18 0.3.  She is noticing some functional improvements with improvement in her iron as well as decrease in her fibromyalgia pain.  Reviewed a full spectrum of opportunities for more mental health and pain management options.  She was appreciative of the information, see AVS for details.  We did specifically review the option for Cymbalta and if she decides she would like to start this we can certainly send that for her.  Encouraged  Pilates and continued exercise, sleep hygiene, and healthy eating patterns.  We will also plan to recheck her iron levels in a few months after full supplementation.  -     Ferritin; Future  -     Hemoglobin; Future  -     Ferritin  -     Hemoglobin    Low vitamin D level  Vitamin D remains mildly low today and advised dose increase in future recheck  -     Vitamin D deficiency screening      Follow up: Return in about 6 months (around 3/9/2022) for Recheck.      Rajwinder Pompa MD    Subjective:      HPI: Casie Pappas is a 41 year old female who is here for:    Chief Complaint   Patient presents with     Labs       Follow up labs, chronic pain/fibromyalgia:    ON the iron supplement for 2 months and feeling better finally- its the heme polypeptide  Currently taking 2 tabs daily (can take up to 3-6 by weight)  No stomach issues.  Feeling stronger, can do more of a walk, but only 15min horse riding still. Was pushing herself before when she felt sick    Anxiety improving (aside from the fear of upcoming  "dose #2 Moderna)    Felt pretty sick with Moderna dose #1.  Sx within 4 hours and lasted 24 hours: nausea, diarrhea, fatigue, sore muscles, fatigue. After 3-4 days closer to normal/back to baseline. Took tylenol q6hr after sx started. Didn't even need the ativan.  She is wondering how dose #2 will go.    She thinks she had COVID previously.    She is on FMLA until October from work.   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   She is very physically active despite her fatigue/fibro sx: horseback riding 1-2hr 4x/week, walks dog daily.  No alcohol. Never smoker.   Rajwinder Pompa MD         Medical History:  Patient Active Problem List   Diagnosis     Anxiety     Fibromyalgia     Rosacea     Chronic fatigue syndrome     Low ferritin without anemia.      Low vitamin D level     Past Medical History:   Diagnosis Date     Acne 02/10/2017     Fibromyalgia     and chronic fatigue syndrome; two sleep studies inconclusive- possible PLMD and/or low iron     Iron deficiency anemia      Recurrent UTI 2016    symptoms, cultures were negative     Supraventricular tachycardia (H) 05/25/2010    Overview:  ELECTROPHYSIOLOGY STUDY,  Ablation 6-.     Vitamin D deficiency      Past Surgical History:   Procedure Laterality Date     CARDIAC ELECTROPHYSIOLOGY STUDY AND ABLATION       Amoxicillin, Celexa [citalopram], Erythromycin, Gabapentin, Gluten meal, Lac bovis, Sulfa drugs, Tetracycline, and Sulfamethoxazole-trimethoprim  Family History   Problem Relation Age of Onset     Fibromyalgia Mother      Breast Cancer Mother         \"pre-breast cancer\"     Macular Degeneration Mother      Fibromyalgia Father      Atrial fibrillation Father      Atrial " fibrillation Brother      Hypothyroidism Maternal Grandmother      Rosacea Maternal Uncle      Depression Other        Medications:  Current Outpatient Medications   Medication     Bacillus Coagulans-Inulin (PROBIOTIC FORMULA) 1-250 BILLION-MG CAPS     Cholecalciferol (VITAMIN D3 PO)     DiphenhydrAMINE HCl (BENADRYL PO)     Fexofenadine HCl (ALLEGRA ALLERGY PO)     hydrOXYzine (ATARAX) 10 MG tablet     IBUPROFEN PO     IRON HEME POLYPEPTIDE PO     LORazepam (ATIVAN) 0.5 MG tablet     MAGNESIUM PO     Multiple Vitamin (MULTIVITAMIN ADULT PO)     No current facility-administered medications for this visit.         Imaging & Labs reviewed this visit: none      Objective:      Vitals:    09/09/21 0953   BP: 112/78   Pulse: 76   Weight: 67.1 kg (147 lb 14.4 oz)       Physical Exam:   General: Alert, no acute distress. Appears calm, asking insightful questions.   HEENT: normocephalic conjunctivae are clear. EOMI  Neck: supple   Lungs: Normal effort  Heart: regular rate  Abdomen: soft   Skin: clear without rash or lesions  Neuro: alert, oriented  Psych: mood mildly anxious but calm, affect appropriate, good eye contact, insight and judgment intact, normal speech pattern.    Rajwinder Pompa MD

## 2021-09-09 NOTE — PATIENT INSTRUCTIONS
"Consideration for Cymbalta down the road.      Fibromyalgia is a cause of chronic muscle and joint pain that likely related to increased sensitivity to central pain pathways in the spinal cord and brain. Much of this likely relates to the loss of normal negative feedback pathways - including dopamine or norepinenephrine - that help dampen normally \"ignored\" pain signals from the peripheral muscles and joints. This results in pain to be experienced at an almost amplified setting (such as if volume control is turned up).     -- The changes in these pathways (dopamine and norepinephrine) also have relationships to energy level, sleep, stress, cognition, and mood. Therefore, symptoms such as chronic fatigue, poor sleep, anxiety/depression, 'foggy thinking' are commonly seen along with this syndrome.     -- Fibromyalgia is a chronic condition and there is no specific cure. The primary long-term goal is managing symptoms to a tolerable level (100% pain-relief is unlikely to occur despite treatment).  -- it is common for fibromyalgia to have flares of increased pain at times, sometimes induced by stressful events, changes in stress/anxiety, poor sleep - though fibromyalgia is not a progressive disease.   -- Treatment involves multiple providers but will be primarily managed by your primary doctor.    Management of fibromyalgia is primarily focused on non-pharmacologic methods, as these have been shown consistently to be the most effective. Here is an overview:    Exercise/Stretching  -- initiation and gradual increase in aerobic activities are probably the most effective initial management for fibromyalgia. This seems to help 'retrain' some of the pain pathways through natural increases in endorphins (such as dopamine or norepinephrine). Emphasis should be made on maintaining a good frequency of exercise (at least 5x/week) -- even on bad days -- up to 20-40min/day.   -- since land-based exercises can be difficult at first, " pool-based therapies and exercises (especially warm-water pools) can be helpful early on.   -- Khmer Chi or yoga-based exercises - those that emphasize mind relaxation and muscle stretching - are also shown to be helpful  -- Weight loss, and/or maintaining a normal weight is also helpful with reducing chronic pain.   -- PILATES    Sleep Hygiene  -- Having good sleep hygiene is important: Make sure to maintain a stable schedule of about 7-8 hours of sleep per night (i.e 10pm to 6am), even on days off/weekends. Make sure no TV/electronics before bed and have a good 30min-60min 'restful period' to use to prepare for falling alseep. If there are concerns about sleep apnea or other sleep disorder (such as daytime somnolence, falling asleep at inappropriate times, excessive snoring), discuss with your primary doctor about sleep evaluation at a sleep center.   -- Sleep apnea can co-exist often, talk to your primary doctor about seeing a sleep specialist or undergoing a sleep study to evaluate for sleep apnea or another sleep-related disorder.    Addressing Mood disorders (anxiety, depression, PTSD, and others)  -- Finding adequate ways to manage stress, as well as adequately managing any underlying depression or anxiety are also very important to manage fibromyalgia.   -- Cognitive behavioral therapy (CBT) can be very useful. This is available through [employer]. Otherwise other online options are available (see below for website).    Medications:   There are some medications used to manage fibromyalgia as well, such as Cymbalta (duloxetine), Savella (milnacipran), amitriptyline, Lyrica (pregablin), gabapentin. These are generally less effective than non-pharmacologic methods and can have a number of potential side effects and drug interactions. These should only be used along with non-medical treatments described above and perscribed through a primary care physician or pain clinic.   -- Narcotics or opioids should be  avoided as they may produce a positive feedback for pain and can make the chronic pain even worse    Long-term Management:  Long-term follow-up for fibromyalgia is best through your primary care physician to best manage your treatment plan/goals, potential medications, and maintain close followup of management of pain. Sometimes a pain clinic can be helpful as well.    Additional online resources:  -- https://fibroguide.med.[facility]/  -- www.fmaware.org (national fibromyalgia association website)  -- www.[employer]   -- https://[employer]uer/au/ (online self help if unable to attend in person; $27/year prescription)

## 2021-09-10 LAB — DEPRECATED CALCIDIOL+CALCIFEROL SERPL-MC: 27 UG/L (ref 30–80)

## 2021-09-15 ENCOUNTER — NURSE TRIAGE (OUTPATIENT)
Dept: NURSING | Facility: CLINIC | Age: 41
End: 2021-09-15

## 2021-09-15 DIAGNOSIS — Z23 HIGH PRIORITY FOR 2019-NCOV VACCINE: Primary | ICD-10-CM

## 2021-09-15 NOTE — TELEPHONE ENCOUNTER
"Pt rec'd 1st Moderna covid vaccine Aug 18th.  Rec'd at [facility].  \"Was thinking about going back there but felt stressful.\"  \"Having anxiety about receiving the 2nd vaccine there.\"  \"Would rather receive vaccine at a [facility] instead.\"    Request for 2nd COVID-19 vaccination due to 1st dose being received at a [facility] healthcare facility or vaccination site (i.e. clinic, pharmacy, school, vaccination pop-up clinic). Vaccination received at [facility] (formerly [facility]). -PROCEED      RN obtained the following information from: Patient      Has patient received Monoclonal Antibody Treatment in the previous 90 days?  NO - Okay to Proceed    The name of 1st dose vaccine product received as first dose: Moderna    Date 1st dose vaccination was given: 8/18/2021    Lot #: [id number]    The 2nd dose of the mRNA COVID-19 vaccine product should be the same vaccine product as the 1st dose and be administered within appropriate timeframe.     Based on the information collected, the patient should receive the vaccine after Sept 15, 2021 date.     ______________    Pt intends to schedule either thru [facility] or by calling [phone].      Yamile Morin   Health Nurse Advisor     Reason for Disposition    COVID-19 vaccine, Frequently Asked Questions (FAQs)     Pt requests  for 2nd Moderna dose. Done now.    Protocols used: CORONAVIRUS (COVID-19) VACCINE QUESTIONS AND [id number]-D-VT 3.25    ______________________    COVID 19 Nurse Triage Plan/Patient Instructions    Please be aware that novel coronavirus (COVID-19) may be circulating in the community. If you develop symptoms such as fever, cough, or SOB or if you have concerns about the presence of another infection including coronavirus (COVID-19), please contact your health care provider or visit [email or web address].     Disposition/Instructions    Additional COVID19 information to add for patients.   How can I protect others?  If you have symptoms " "(fever, cough, body aches or trouble breathing): Stay home and away from others (self-isolate) until:    At least 10 days have passed since your symptoms started, And     You ve had no fever--and no medicine that reduces fever--for 1 full day (24 hours), And      Your other symptoms have resolved (gotten better).     If you don t have symptoms, but a test showed that you have COVID-19 (you tested positive):    Stay home and away from others (self-isolate). Follow the tips under \"How do I self-isolate?\" below for 10 days (20 days if you have a weak immune system).    You don't need to be retested for COVID-19 before going back to school or work. As long as you're fever-free and feeling better, you can go back to school, work and other activities after waiting the 10 or 20 days.     How do I self-isolate?    Stay in your own room, even for meals. Use your own bathroom if you can.     Stay away from others in your home. No hugging, kissing or shaking hands. No visitors.    Don t go to work, school or anywhere else.     Clean  high touch  surfaces often (doorknobs, counters, handles, etc.). Use a household cleaning spray or wipes. You ll find a full list on the EPA website:  www.epa.gov/pesticide-registration/list-n-disinfectants-use-against-sars-cov-2.    Cover your mouth and nose with a mask, tissue or washcloth to avoid spreading germs.    Wash your hands and face often. Use soap and water.    Caregivers in these groups are at risk for severe illness due to COVID-19:  o People 65 years and older  o People who live in a nursing home or long-term care facility  o People with chronic disease (lung, heart, cancer, diabetes, kidney, liver, immunologic)  o People who have a weakened immune system, including those who:  - Are in cancer treatment  - Take medicine that weakens the immune system, such as corticosteroids  - Had a bone marrow or organ transplant  - Have an immune deficiency  - Have poorly controlled HIV or " AIDS  - Are obese (body mass index of 40 or higher)  - Smoke regularly    Caregivers should wear gloves while washing dishes, handling laundry and cleaning bedrooms and bathrooms.    Use caution when washing and drying laundry: Don t shake dirty laundry, and use the warmest water setting that you can.    For more tips, go to www.cdc.gov/coronavirus/2019-ncov/downloads/10Things.pdf.    How can I take care of myself?  1. Get lots of rest. Drink extra fluids (unless a doctor has told you not to).     2. Take Tylenol (acetaminophen) for fever or pain. If you have liver or kidney problems, ask your family doctor if it s okay to take Tylenol.     Adults can take either:     650 mg (two 325 mg pills) every 4 to 6 hours, or     1,000 mg (two 500 mg pills) every 8 hours as needed.     Note: Don t take more than 3,000 mg in one day.   Acetaminophen is found in many medicines (both prescribed and over-the-counter medicines). Read all labels to be sure you don t take too much.     For children, check the Tylenol bottle for the right dose. The dose is based on the child s age or weight.    3. If you have other health problems (like cancer, heart failure, an organ transplant or severe kidney disease): Call your specialty clinic if you don t feel better in the next 2 days.    4. Know when to call 911: Emergency warning signs include:    Trouble breathing or shortness of breath    Pain or pressure in the chest that doesn t go away    Feeling confused like you haven t felt before, or not being able to wake up    Bluish-colored lips or face    What are the symptoms of COVID-19?     The most common symptoms are cough, fever and trouble breathing.     Less common symptoms include body aches, chills, diarrhea (loose, watery poops), fatigue (feeling very tired), headache, runny nose, sore throat and loss of smell.    COVID-19 can cause severe coughing (bronchitis) and lung infection (pneumonia).    How does it spread?     The virus may  spread when a person coughs or sneezes into the air. The virus can travel about 6 feet this way, and it can live on surfaces.      Common  (household disinfectants) will kill the virus.    Who is at risk?  Anyone can catch COVID-19 if they re around someone who has the virus.    How can others protect themselves?     Stay away from people who have COVID-19 (or symptoms of COVID-19).    Wash hands often with soap and water. Or, use hand  with at least 60% alcohol.    Avoid touching the eyes, nose or mouth.     Wear a face mask when you go out in public, when sick or when caring for a sick person.    Where can I get more information?     [facility]: About COVID-19: [email or web address]    CDC: What to Do If You re Sick: www.cdc.gov/coronavirus/2019-ncov/about/steps-when-sick.html    CDC: Ending Home Isolation: www.cdc.gov/coronavirus/2019-ncov/hcp/disposition-in-home-patients.html     CDC: Caring for Someone: www.cdc.gov/coronavirus/2019-ncov/if-you-are-sick/care-for-someone.html     [facility]: Interim Guidance for Hospital Discharge to Home: www.[facility].[place]./diseases/coronavirus/hcp/hospdischarge.pdf    [facility] clinical trials (COVID-19 research studies): clinicalaffairs.[facility]-clinical-trials     Below are the COVID-19 hotlines at the [facility] ([facility]). Interpreters are available.   o For health questions: Call 336-542-5708 or 1-597.856.1900 (7 a.m. to 7 p.m.)  o For questions about schools and childcare: Call 134-840-4174 or 1-927.631.1183 (7 a.m. to 7 p.m.)          Thank you for taking steps to prevent the spread of this virus.  o Limit your contact with others.  o Wear a simple mask to cover your cough.  o Wash your hands well and often.    Resources    [facility]: About COVID-19: [email or web address]    CDC: What to Do If You're Sick: www.cdc.gov/coronavirus/2019-ncov/about/steps-when-sick.html    CDC: Ending Home  Isolation: www.cdc.gov/coronavirus/2019-ncov/hcp/disposition-in-home-patients.html     CDC: Caring for Someone: www.cdc.gov/coronavirus/2019-ncov/if-you-are-sick/care-for-someone.html     [facility]: Interim Guidance for Hospital Discharge to Home: www.[facility].[place]./diseases/coronavirus/hcp/hospdischarge.pdf    [facility] clinical trials (COVID-19 research studies): clinicalaffairs.[facility]-clinical-trials     Below are the COVID-19 hotlines at the [place] [facility] ([facility]). Interpreters are available.   o For health questions: Call 988-045-3332 or 1-374.633.9841 (7 a.m. to 7 p.m.)  o For questions about schools and childcare: Call 402-230-8528 or 1-214.456.6984 (7 a.m. to 7 p.m.)

## 2021-09-16 ENCOUNTER — IMMUNIZATION (OUTPATIENT)
Dept: NURSING | Facility: CLINIC | Age: 41
End: 2021-09-16
Attending: OTOLARYNGOLOGY
Payer: COMMERCIAL

## 2021-09-16 DIAGNOSIS — Z23 HIGH PRIORITY FOR 2019-NCOV VACCINE: ICD-10-CM

## 2021-09-16 PROCEDURE — 0012A PR COVID VAC MODERNA 100 MCG/0.5 ML IM: CPT

## 2021-09-16 PROCEDURE — 91301 PR COVID VAC MODERNA 100 MCG/0.5 ML IM: CPT

## 2021-09-19 ENCOUNTER — HEALTH MAINTENANCE LETTER (OUTPATIENT)
Age: 41
End: 2021-09-19

## 2022-01-09 ENCOUNTER — HEALTH MAINTENANCE LETTER (OUTPATIENT)
Age: 42
End: 2022-01-09

## 2022-11-21 ENCOUNTER — HEALTH MAINTENANCE LETTER (OUTPATIENT)
Age: 42
End: 2022-11-21

## 2023-01-13 ENCOUNTER — TRANSFERRED RECORDS (OUTPATIENT)
Dept: HEALTH INFORMATION MANAGEMENT | Facility: CLINIC | Age: 43
End: 2023-01-13

## 2023-01-18 ENCOUNTER — E-VISIT (OUTPATIENT)
Dept: FAMILY MEDICINE | Facility: CLINIC | Age: 43
End: 2023-01-18
Payer: COMMERCIAL

## 2023-01-18 DIAGNOSIS — D89.40 MAST CELL ACTIVATION SYNDROME (H): Primary | ICD-10-CM

## 2023-01-18 PROCEDURE — 99421 OL DIG E/M SVC 5-10 MIN: CPT | Performed by: FAMILY MEDICINE

## 2023-01-18 NOTE — TELEPHONE ENCOUNTER
Provider E-Visit time total (minutes): 3    Staff, please look for the indicated fax from pt; I've placed orders

## 2023-01-18 NOTE — PATIENT INSTRUCTIONS
Venancio Jason, thanks for these updates. I'll place the lab orders you requested and have my team watch for the fax (I haven't yet received it). Please schedule an appointment with the lab right here in [facility], or call [phone].  I will let you know when the results are back and next steps to take.  Thanks,   Dr. Pompa

## 2023-01-31 ENCOUNTER — LAB (OUTPATIENT)
Dept: LAB | Facility: CLINIC | Age: 43
End: 2023-01-31
Payer: COMMERCIAL

## 2023-01-31 DIAGNOSIS — D89.40 MAST CELL ACTIVATION SYNDROME (H): ICD-10-CM

## 2023-01-31 LAB
DEPRECATED CALCIDIOL+CALCIFEROL SERPL-MC: 10 UG/L (ref 20–75)
FERRITIN SERPL-MCNC: 16 NG/ML (ref 6–175)
MAGNESIUM SERPL-MCNC: 2.2 MG/DL (ref 1.7–2.3)
VIT B12 SERPL-MCNC: 495 PG/ML (ref 232–1245)

## 2023-01-31 PROCEDURE — 36415 COLL VENOUS BLD VENIPUNCTURE: CPT

## 2023-01-31 PROCEDURE — 83735 ASSAY OF MAGNESIUM: CPT

## 2023-01-31 PROCEDURE — 82306 VITAMIN D 25 HYDROXY: CPT

## 2023-01-31 PROCEDURE — 82607 VITAMIN B-12: CPT

## 2023-01-31 PROCEDURE — 82728 ASSAY OF FERRITIN: CPT

## 2023-02-01 ENCOUNTER — MYC MEDICAL ADVICE (OUTPATIENT)
Dept: FAMILY MEDICINE | Facility: CLINIC | Age: 43
End: 2023-02-01
Payer: COMMERCIAL

## 2023-02-01 DIAGNOSIS — E55.9 VITAMIN D DEFICIENCY: Primary | ICD-10-CM

## 2023-02-01 RX ORDER — ERGOCALCIFEROL 1.25 MG/1
50000 CAPSULE, LIQUID FILLED ORAL WEEKLY
Qty: 24 CAPSULE | Refills: 1 | Status: SHIPPED | OUTPATIENT
Start: 2023-02-01

## 2023-02-01 RX ORDER — CHOLECALCIFEROL (VITAMIN D3) 50 MCG
1 TABLET ORAL DAILY
Qty: 90 TABLET | Refills: 3 | Status: SHIPPED | OUTPATIENT
Start: 2023-02-01

## 2023-04-16 ENCOUNTER — HEALTH MAINTENANCE LETTER (OUTPATIENT)
Age: 43
End: 2023-04-16

## 2023-06-21 ENCOUNTER — E-VISIT (OUTPATIENT)
Dept: URGENT CARE | Facility: CLINIC | Age: 43
End: 2023-06-21
Payer: COMMERCIAL

## 2023-06-21 DIAGNOSIS — R30.0 DYSURIA: Primary | ICD-10-CM

## 2023-06-21 PROCEDURE — 99207 PR NON-BILLABLE SERV PER CHARTING: CPT | Performed by: NURSE PRACTITIONER

## 2023-06-21 NOTE — PATIENT INSTRUCTIONS
Dear Casie Pappas,     After reviewing your responses, I would like you to come in for a urine test to make sure we treat you correctly. This urine test is to evaluate you for a possible urinary tract infection, and should be scheduled for today or tomorrow. Schedule a Lab Only appointment here.     Lab appointments are not available at most locations on the weekends. If no Lab Only appointment is available, you should be seen in any of our convenient Walk-in or Urgent Care Centers, which can be found on our website here.     You will receive instructions with your results in [employer] once they are available.     If your symptoms worsen, you develop pain in your back or stomach, develop fevers, or are not improving in 5 days, please contact your primary care provider for an appointment or visit a Walk-in or Urgent Care Center to be seen.     Thanks again for choosing us as your health care partner,     Sally Mckeon NP

## 2023-06-22 ENCOUNTER — LAB (OUTPATIENT)
Dept: LAB | Facility: CLINIC | Age: 43
End: 2023-06-22
Payer: COMMERCIAL

## 2023-06-22 DIAGNOSIS — R30.0 DYSURIA: ICD-10-CM

## 2023-06-22 LAB
ALBUMIN UR-MCNC: NEGATIVE MG/DL
APPEARANCE UR: CLEAR
BILIRUB UR QL STRIP: NEGATIVE
COLOR UR AUTO: YELLOW
GLUCOSE UR STRIP-MCNC: NEGATIVE MG/DL
HGB UR QL STRIP: NEGATIVE
KETONES UR STRIP-MCNC: NEGATIVE MG/DL
LEUKOCYTE ESTERASE UR QL STRIP: NEGATIVE
NITRATE UR QL: NEGATIVE
PH UR STRIP: 7.5 [PH] (ref 5–8)
SP GR UR STRIP: 1.01 (ref 1–1.03)
UROBILINOGEN UR STRIP-ACNC: 0.2 E.U./DL

## 2023-06-22 PROCEDURE — 87086 URINE CULTURE/COLONY COUNT: CPT

## 2023-06-22 PROCEDURE — 81003 URINALYSIS AUTO W/O SCOPE: CPT

## 2023-06-24 LAB — BACTERIA UR CULT: NORMAL

## 2023-11-28 NOTE — PROGRESS NOTES
Assessment/plan   Casie Pappas is a 39 y.o. female who is established to my practice.    Fibromyalgia  Chronic fatigue syndrome, contributing to anxiousness at times  Fibromyalgia diagnosis back in 2007.  She has been living with this and frequently struggles with mistrust of herself with unpredictability of her symptoms as well as mistrust of the healthcare system.  She has received variable diagnoses with regard to her sleep medicine results.    Last visit we ruled out thyroid disease and anemia, but did identify a very low ferritin at 8 and low vitamin D.  -She has a letter from me to allow remote work if her boss will allow this.  At this time she is holding off and less she has physical symptoms to a point where she cannot drive into work   -She is going to continue with ferritin and vitamin D replacement to see if this improves her symptoms already noticing an provement   -Continues to proceed with counseling at my recommendation   -Continues with counseling through her [facility] as well, CBT based  -Encourage mindfulness type activities for her anxiety, prefers this to medication at this time  -Discussed options for turmeric for pain.    -Encouraged to continue her exercise routine.    - Going forward we could consider hydroxyzine or Cymbalta. Did not tolerate gabapentin.     Low ferritin- iron deficiency without anemia.   Ferritin of 8, Hb normal.   - Increasing PO iron intake x 1 month  - Started on [employer] with iron 1 month ago  - She prefers to hold off on iron supplement at this time until recheck    Low vitamin D.  Taking 5000 international unit supplement now, will recheck at 3 month cruziot    Follow up: 1.5 months for pap if not having her menstrual cycle; will be due for VitD and ferritin labs at that time    Rajwinder Pompa MD    Subjective:      HPI: Casie Pappas is a 39 y.o. female who is here for:    Chief Complaint   Patient presents with     Follow-up     vitamin D check and Iron, no pap, Td or Flu  Is the patient currently in the state of [place]? YES    Visit mode:TELEPHONE    If the visit is dropped, the patient can be reconnected by: TELEPHONE VISIT: Phone number:   Telephone Information:   Mobile [phone]       Will anyone else be joining the visit? NO  (If patient encounters technical issues they should call [phone] :[phone])    How would you like to obtain your AVS? MyChart    Are changes needed to the allergy or medication list?  Due to time, we didn't review meds.  Please remove any meds marked not taking and any flagged for removal.    Reason for visit: Consult    Wt/ht other than 24 hrs:  none given  Pain more than one location:  no  Krystal ARTEAGAF              shot       F/u fibromyalgia: Her Boss wasn't receptive to working from home. The letter we sent did not help to allow her to work remotely. She will keep the letter for HR purposes if possible. Her boss wants her in the office right now she is not willing to risk her job at this point.  So far she has been able to drive and work, but sometimes her fatigue is enough that she would feel unsafe and at that point she would show him the letter to support working remotely.     I did send a ferrous sulfate and vitamin D 50,000 international units weekly for 12 weeks on 10/12.  She has not yet started these medications.    Instead she has started 5000 IU vitamin D. She is also taking Centrum multivitamin. Has been increasing red meat twice weekly now.  Iron supplement she will investigate. She plans to restart this at the 3 month recheck cruzito.  Fatigue is less with these supplements.  She is hopeful that perhaps her low iron and low vitamin D have been contributing more significantly to her chronic fatigue than initially appreciated.  Hasn't needed the naproxen. Using advil only as needed for pain.  She did start with counseling and is also finding this helpful.  Also involved with CBT counseling at her [facility].  This has been a long-standing relationship since before we met and she finds this relationship most helpful in her counseling process.    Review of Systems:  She is on her menstrual cycle again right now so she declines Pap  12 point comprehensive review of systems was negative except as noted and HPI     Social History:  Social History     Patient does not qualify to have social determinant information on file (likely too young).   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      10 years, no children.     She is very physically active despite her fatigue/fibro sx: horseback riding 1-2hr 4x/week, walks dog daily.    No alcohol. Never smoker.     Rajwinder Pompa MD  "      Medical History:  Patient Active Problem List   Diagnosis     Anxiety     Fibromyalgia     Rosacea     Chronic fatigue syndrome     Past Medical History:   Diagnosis Date     Fibromyalgia     and chronic fatigue syndrome; two sleep studies inconclusive- possible PLMD and/or low iron     Iron deficiency anemia      Recurrent UTI 2016    symptoms, cultures were negative     rosacea 02/10/2017     Supraventricular tachycardia (H) 05/25/2010    Overview:  ELECTROPHYSIOLOGY STUDY,  Ablation 6-.     Vitamin D deficiency      Past Surgical History:   Procedure Laterality Date     CARDIAC ELECTROPHYSIOLOGY STUDY AND ABLATION       Amoxicillin; Dairy; Erythromycin; Gabapentin; Gluten protein; Sulfa (sulfonamide antibiotics); Tetracycline; Azithromycin (bulk); and Citalopram  Family History   Problem Relation Age of Onset     Fibromyalgia Mother      Breast cancer Mother         \"pre-breast cancer\"     Macular degeneration Mother      Fibromyalgia Father      Atrial fibrillation Father      Atrial fibrillation Brother      Hypothyroidism Maternal Grandmother      Rosacea Maternal Uncle      Depression Other        Medications:  Current Outpatient Medications   Medication Sig Dispense Refill     cholecalciferol, vitamin D3, (VITAMIN D3) 4,000 unit cap Take by mouth.       diphenhydrAMINE (BENADRYL) 25 mg capsule Take 50 mg by mouth 2 (two) times a day.       FEXOFENADINE HCL (ALLEGRA ORAL) Take by mouth every morning.       IBUPROFEN (ADVIL ORAL) Take by mouth as needed.       MAGNESIUM ORAL Take by mouth. 350 mg daily.       multivitamin/iron/folic acid (CENTRUM WOMEN ORAL) Take by mouth.       ergocalciferol (ERGOCALCIFEROL) 50,000 unit capsule Take 1 capsule (50,000 Units total) by mouth once a week for 12 doses. 12 capsule 0     ferrous sulfate 325 (65 FE) MG tablet Take 1 tablet (325 mg total) by mouth 2 (two) times a day. Best absorbed with orange juice/VitaminC; avoid dairy at time of taking med. 60 tablet " 3     naproxen (NAPROSYN) 500 MG tablet Take 1 tablet (500 mg total) by mouth 2 (two) times a day with meals. 60 tablet 2     No current facility-administered medications for this visit.        Imaging & Labs reviewed this visit:   Lab Results   Component Value Date    TSH 0.75 02/10/2017       Results for NEMESIO RINALDI (MRN [id number]) as of 11/5/2019 13:24   Ref. Range 10/9/2019 11:46   Triglycerides Latest Ref Range: <=149 mg/dL 41   HDL Cholesterol Latest Ref Range: >=50 mg/dL 70   LDL Calculated Latest Ref Range: <=129 mg/dL 76   Free T4 Latest Ref Range: 0.7 - 1.8 ng/dL 1.0   Glucose Latest Ref Range: 70 - 125 mg/dL 102   T3, Total Latest Ref Range: 45 - 175 ng/dL 102   Vitamin D, Total (25-Hydroxy) Latest Ref Range: 30.0 - 80.0 ng/mL 17.3 (L)   Ferritin Latest Ref Range: 10 - 130 ng/mL 8 (L)   Hemoglobin A1c Latest Ref Range: 3.5 - 6.0 % 5.3   Vitamin B-12 Latest Ref Range: 213 - 816 pg/mL 413     Objective:      Vitals:    11/04/19 1627   BP: 112/64   Pulse: 76   Weight: 140 lb 11.2 oz (63.8 kg)       Physical Exam:     General: Alert, no acute distress.   HEENT: normocephalic conjunctivae are clear. EOMI  Neck: supple   Lungs: Normal effort  Heart: regular rate  Abdomen: soft   Skin: She has baseline rosacea of her cheeks which is quite prominent and stable from previous visit  Neuro: alert, oriented  Psych: mood good, affect appropriate, good eye contact, insight and judgment intact, normal speech pattern.        Rajwinder Pompa MD

## 2024-02-03 ENCOUNTER — HEALTH MAINTENANCE LETTER (OUTPATIENT)
Age: 44
End: 2024-02-03

## 2024-06-22 ENCOUNTER — HEALTH MAINTENANCE LETTER (OUTPATIENT)
Age: 44
End: 2024-06-22

## 2024-11-03 ENCOUNTER — E-VISIT (OUTPATIENT)
Dept: URGENT CARE | Facility: CLINIC | Age: 44
End: 2024-11-03
Payer: COMMERCIAL

## 2024-11-03 DIAGNOSIS — R30.0 DYSURIA: Primary | ICD-10-CM

## 2024-11-03 PROCEDURE — 99421 OL DIG E/M SVC 5-10 MIN: CPT | Performed by: NURSE PRACTITIONER

## 2024-11-03 NOTE — PATIENT INSTRUCTIONS
Dear Casie Pappas,     After reviewing your responses, I would like you to come in for a urine test to make sure we treat you correctly. This urine test is to evaluate you for a possible urinary tract infection, and should be scheduled for today or tomorrow. Schedule a Lab Only appointment here.     Lab appointments are not available at most locations on the weekends. If no Lab Only appointment is available, you should be seen in any of our convenient Walk-in or Urgent Care Centers, which can be found on our website here.     You will receive instructions with your results in [employer] once they are available.     If your symptoms worsen, you develop pain in your back or stomach, develop fevers, or are not improving in 5 days, please contact your primary care provider for an appointment or visit a Walk-in or Urgent Care Center to be seen.     Thanks again for choosing us as your health care partner,     ALEXA BOLAÑOS CNP

## 2024-11-04 ENCOUNTER — LAB (OUTPATIENT)
Dept: LAB | Facility: CLINIC | Age: 44
End: 2024-11-04
Payer: COMMERCIAL

## 2024-11-04 DIAGNOSIS — R30.0 DYSURIA: ICD-10-CM

## 2024-11-04 LAB
ALBUMIN UR-MCNC: NEGATIVE MG/DL
APPEARANCE UR: CLEAR
BACTERIA #/AREA URNS HPF: ABNORMAL /HPF
BILIRUB UR QL STRIP: NEGATIVE
COLOR UR AUTO: YELLOW
GLUCOSE UR STRIP-MCNC: NEGATIVE MG/DL
HGB UR QL STRIP: NEGATIVE
KETONES UR STRIP-MCNC: NEGATIVE MG/DL
LEUKOCYTE ESTERASE UR QL STRIP: ABNORMAL
NITRATE UR QL: NEGATIVE
PH UR STRIP: 7 [PH] (ref 5–8)
RBC #/AREA URNS AUTO: ABNORMAL /HPF
SP GR UR STRIP: 1.01 (ref 1–1.03)
SQUAMOUS #/AREA URNS AUTO: ABNORMAL /LPF
UROBILINOGEN UR STRIP-ACNC: 0.2 E.U./DL
WBC #/AREA URNS AUTO: ABNORMAL /HPF

## 2024-11-04 PROCEDURE — 81001 URINALYSIS AUTO W/SCOPE: CPT

## 2024-11-23 ENCOUNTER — LAB (OUTPATIENT)
Dept: LAB | Facility: CLINIC | Age: 44
End: 2024-11-23
Payer: COMMERCIAL

## 2024-11-23 DIAGNOSIS — R30.0 DYSURIA: ICD-10-CM

## 2024-11-23 LAB
ALBUMIN UR-MCNC: NEGATIVE MG/DL
APPEARANCE UR: CLEAR
BILIRUB UR QL STRIP: NEGATIVE
COLOR UR AUTO: YELLOW
GLUCOSE UR STRIP-MCNC: NEGATIVE MG/DL
HGB UR QL STRIP: NEGATIVE
KETONES UR STRIP-MCNC: NEGATIVE MG/DL
LEUKOCYTE ESTERASE UR QL STRIP: NEGATIVE
NITRATE UR QL: NEGATIVE
PH UR STRIP: 7 [PH] (ref 5–8)
SP GR UR STRIP: 1.01 (ref 1–1.03)
UROBILINOGEN UR STRIP-ACNC: 0.2 E.U./DL

## 2024-11-23 PROCEDURE — 81003 URINALYSIS AUTO W/O SCOPE: CPT

## 2025-07-12 ENCOUNTER — HEALTH MAINTENANCE LETTER (OUTPATIENT)
Age: 45
End: 2025-07-12

## 2025-08-07 ENCOUNTER — PATIENT OUTREACH (OUTPATIENT)
Dept: CARE COORDINATION | Facility: CLINIC | Age: 45
End: 2025-08-07
Payer: COMMERCIAL